# Patient Record
Sex: FEMALE | Race: BLACK OR AFRICAN AMERICAN | NOT HISPANIC OR LATINO
[De-identification: names, ages, dates, MRNs, and addresses within clinical notes are randomized per-mention and may not be internally consistent; named-entity substitution may affect disease eponyms.]

---

## 2020-07-22 ENCOUNTER — APPOINTMENT (OUTPATIENT)
Dept: OBGYN | Facility: CLINIC | Age: 25
End: 2020-07-22
Payer: MEDICAID

## 2020-07-22 VITALS
HEIGHT: 61 IN | WEIGHT: 216.25 LBS | SYSTOLIC BLOOD PRESSURE: 125 MMHG | BODY MASS INDEX: 40.83 KG/M2 | DIASTOLIC BLOOD PRESSURE: 80 MMHG

## 2020-07-22 DIAGNOSIS — Z01.419 ENCOUNTER FOR GYNECOLOGICAL EXAMINATION (GENERAL) (ROUTINE) W/OUT ABNORMAL FINDINGS: ICD-10-CM

## 2020-07-22 PROBLEM — Z00.00 ENCOUNTER FOR PREVENTIVE HEALTH EXAMINATION: Status: ACTIVE | Noted: 2020-07-22

## 2020-07-22 LAB
HCG UR QL: NEGATIVE
QUALITY CONTROL: YES

## 2020-07-22 PROCEDURE — 99385 PREV VISIT NEW AGE 18-39: CPT

## 2020-07-22 PROCEDURE — 81025 URINE PREGNANCY TEST: CPT

## 2020-07-22 NOTE — CHIEF COMPLAINT
[Initial Visit] : initial GYN visit [FreeTextEntry1] : the patient presents complaining of irregular menstrual periods, every 1 to 6 months.

## 2020-07-22 NOTE — HISTORY OF PRESENT ILLNESS
[Sexually Active] : is sexually active [Menarche Age: ____] : age at menarche was [unfilled] [Irregular Menses] : irregular menses [Regular Cycle Intervals] : periods have been irregular [Contraception] : does not use contraception

## 2020-07-22 NOTE — REVIEW OF SYSTEMS
[Nl] : Integumentary [Increased Time Between Periods] : increased time between periods [Irregular Cycle Intervals] : periods are irregular

## 2020-07-23 LAB
DHEA-S SERPL-MCNC: 189 UG/DL
FSH SERPL-MCNC: 3.2 IU/L
HCG-TM SERPL-MCNC: <1 MIU/ML
INSULIN P FAST SERPL-ACNC: 20.7 UU/ML
LH SERPL-ACNC: 6.6 IU/L
PROLACTIN SERPL-MCNC: 12 NG/ML
TSH SERPL-ACNC: 1.22 UIU/ML

## 2020-07-27 ENCOUNTER — APPOINTMENT (OUTPATIENT)
Dept: ULTRASOUND IMAGING | Facility: HOSPITAL | Age: 25
End: 2020-07-27

## 2020-07-27 LAB
CYTOLOGY CVX/VAG DOC THIN PREP: NORMAL
TESTOST BND SERPL-MCNC: 2.2 PG/ML
TESTOST SERPL-MCNC: 41.7 NG/DL

## 2020-07-28 LAB — 17OHP SERPL-MCNC: 24 NG/DL

## 2020-07-29 ENCOUNTER — APPOINTMENT (OUTPATIENT)
Dept: ULTRASOUND IMAGING | Facility: CLINIC | Age: 25
End: 2020-07-29
Payer: MEDICAID

## 2020-07-29 ENCOUNTER — OUTPATIENT (OUTPATIENT)
Dept: OUTPATIENT SERVICES | Facility: HOSPITAL | Age: 25
LOS: 1 days | End: 2020-07-29
Payer: MEDICAID

## 2020-07-29 DIAGNOSIS — N92.6 IRREGULAR MENSTRUATION, UNSPECIFIED: ICD-10-CM

## 2020-07-29 DIAGNOSIS — Z00.00 ENCOUNTER FOR GENERAL ADULT MEDICAL EXAMINATION WITHOUT ABNORMAL FINDINGS: ICD-10-CM

## 2020-07-29 PROCEDURE — 76856 US EXAM PELVIC COMPLETE: CPT

## 2020-07-29 PROCEDURE — 76856 US EXAM PELVIC COMPLETE: CPT | Mod: 26

## 2020-08-05 ENCOUNTER — APPOINTMENT (OUTPATIENT)
Dept: OBGYN | Facility: CLINIC | Age: 25
End: 2020-08-05
Payer: MEDICAID

## 2020-08-05 VITALS
SYSTOLIC BLOOD PRESSURE: 125 MMHG | HEIGHT: 61 IN | WEIGHT: 225 LBS | BODY MASS INDEX: 42.48 KG/M2 | DIASTOLIC BLOOD PRESSURE: 88 MMHG

## 2020-08-05 DIAGNOSIS — Z30.09 ENCOUNTER FOR OTHER GENERAL COUNSELING AND ADVICE ON CONTRACEPTION: ICD-10-CM

## 2020-08-05 PROCEDURE — 99213 OFFICE O/P EST LOW 20 MIN: CPT

## 2020-08-05 NOTE — PHYSICAL EXAM
[Awake] : awake [Acute Distress] : no acute distress [Alert] : alert [Oriented x3] : oriented to person, place, and time [Tender] : non tender [Soft] : soft [Normal] : uterus [Nabothian Cyst ___ cm] : had a [unfilled] ~Ucm nabothian cyst [No Bleeding] : there was no active vaginal bleeding [Uterine Adnexae] : were not tender and not enlarged

## 2020-08-05 NOTE — REVIEW OF SYSTEMS
[Increased Time Between Periods] : increased time between periods [Irregular Cycle Intervals] : periods are irregular [Nl] : Musculoskeletal

## 2020-09-05 ENCOUNTER — EMERGENCY (EMERGENCY)
Facility: HOSPITAL | Age: 25
LOS: 1 days | Discharge: HOME | End: 2020-09-05
Attending: EMERGENCY MEDICINE
Payer: MEDICAID

## 2020-09-05 VITALS
TEMPERATURE: 98 F | RESPIRATION RATE: 16 BRPM | OXYGEN SATURATION: 97 % | HEIGHT: 61 IN | DIASTOLIC BLOOD PRESSURE: 78 MMHG | SYSTOLIC BLOOD PRESSURE: 137 MMHG | WEIGHT: 220.02 LBS | HEART RATE: 105 BPM

## 2020-09-05 PROCEDURE — 99285 EMERGENCY DEPT VISIT HI MDM: CPT

## 2020-09-05 NOTE — ED ADULT TRIAGE NOTE - CHIEF COMPLAINT QUOTE
Patient A&Ox4 complaining of sudden onset chest pain that began at appx 6:30pm tonight. Stated feels like heart is racing & pain is sharp in nature.

## 2020-09-06 VITALS
OXYGEN SATURATION: 100 % | DIASTOLIC BLOOD PRESSURE: 59 MMHG | SYSTOLIC BLOOD PRESSURE: 119 MMHG | TEMPERATURE: 98 F | HEART RATE: 85 BPM | RESPIRATION RATE: 17 BRPM

## 2020-09-06 LAB
ALBUMIN SERPL ELPH-MCNC: 4 G/DL — SIGNIFICANT CHANGE UP (ref 3.3–5.2)
ALP SERPL-CCNC: 103 U/L — SIGNIFICANT CHANGE UP (ref 40–120)
ALT FLD-CCNC: 19 U/L — SIGNIFICANT CHANGE UP
ANION GAP SERPL CALC-SCNC: 13 MMOL/L — SIGNIFICANT CHANGE UP (ref 5–17)
ANISOCYTOSIS BLD QL: SLIGHT — SIGNIFICANT CHANGE UP
APTT BLD: 36.8 SEC — HIGH (ref 27.5–35.5)
AST SERPL-CCNC: 20 U/L — SIGNIFICANT CHANGE UP
BASOPHILS # BLD AUTO: 0 K/UL — SIGNIFICANT CHANGE UP (ref 0–0.2)
BASOPHILS NFR BLD AUTO: 0 % — SIGNIFICANT CHANGE UP (ref 0–2)
BILIRUB SERPL-MCNC: <0.2 MG/DL — LOW (ref 0.4–2)
BUN SERPL-MCNC: 6 MG/DL — LOW (ref 8–20)
CALCIUM SERPL-MCNC: 9.4 MG/DL — SIGNIFICANT CHANGE UP (ref 8.6–10.2)
CHLORIDE SERPL-SCNC: 96 MMOL/L — LOW (ref 98–107)
CO2 SERPL-SCNC: 28 MMOL/L — SIGNIFICANT CHANGE UP (ref 22–29)
CREAT SERPL-MCNC: 0.87 MG/DL — SIGNIFICANT CHANGE UP (ref 0.5–1.3)
D DIMER BLD IA.RAPID-MCNC: <150 NG/ML DDU — SIGNIFICANT CHANGE UP
ELLIPTOCYTES BLD QL SMEAR: SLIGHT — SIGNIFICANT CHANGE UP
EOSINOPHIL # BLD AUTO: 0.31 K/UL — SIGNIFICANT CHANGE UP (ref 0–0.5)
EOSINOPHIL NFR BLD AUTO: 3.5 % — SIGNIFICANT CHANGE UP (ref 0–6)
GIANT PLATELETS BLD QL SMEAR: PRESENT — SIGNIFICANT CHANGE UP
GLUCOSE SERPL-MCNC: 126 MG/DL — HIGH (ref 70–99)
HCG SERPL-ACNC: <4 MIU/ML — SIGNIFICANT CHANGE UP
HCT VFR BLD CALC: 40.9 % — SIGNIFICANT CHANGE UP (ref 34.5–45)
HGB BLD-MCNC: 13.1 G/DL — SIGNIFICANT CHANGE UP (ref 11.5–15.5)
INR BLD: 1.14 RATIO — SIGNIFICANT CHANGE UP (ref 0.88–1.16)
LIDOCAIN IGE QN: 21 U/L — LOW (ref 22–51)
LYMPHOCYTES # BLD AUTO: 2.74 K/UL — SIGNIFICANT CHANGE UP (ref 1–3.3)
LYMPHOCYTES # BLD AUTO: 30.7 % — SIGNIFICANT CHANGE UP (ref 13–44)
MAGNESIUM SERPL-MCNC: 1.9 MG/DL — SIGNIFICANT CHANGE UP (ref 1.6–2.6)
MANUAL SMEAR VERIFICATION: SIGNIFICANT CHANGE UP
MCHC RBC-ENTMCNC: 23.3 PG — LOW (ref 27–34)
MCHC RBC-ENTMCNC: 32 GM/DL — SIGNIFICANT CHANGE UP (ref 32–36)
MCV RBC AUTO: 72.6 FL — LOW (ref 80–100)
MICROCYTES BLD QL: SLIGHT — SIGNIFICANT CHANGE UP
MONOCYTES # BLD AUTO: 0.54 K/UL — SIGNIFICANT CHANGE UP (ref 0–0.9)
MONOCYTES NFR BLD AUTO: 6.1 % — SIGNIFICANT CHANGE UP (ref 2–14)
MYELOCYTES NFR BLD: 0.9 % — HIGH (ref 0–0)
NEUTROPHILS # BLD AUTO: 5.24 K/UL — SIGNIFICANT CHANGE UP (ref 1.8–7.4)
NEUTROPHILS NFR BLD AUTO: 58.8 % — SIGNIFICANT CHANGE UP (ref 43–77)
OVALOCYTES BLD QL SMEAR: SLIGHT — SIGNIFICANT CHANGE UP
PLAT MORPH BLD: NORMAL — SIGNIFICANT CHANGE UP
PLATELET # BLD AUTO: 541 K/UL — HIGH (ref 150–400)
POLYCHROMASIA BLD QL SMEAR: SLIGHT — SIGNIFICANT CHANGE UP
POTASSIUM SERPL-MCNC: 3.1 MMOL/L — LOW (ref 3.5–5.3)
POTASSIUM SERPL-SCNC: 3.1 MMOL/L — LOW (ref 3.5–5.3)
PROT SERPL-MCNC: 7.5 G/DL — SIGNIFICANT CHANGE UP (ref 6.6–8.7)
PROTHROM AB SERPL-ACNC: 13.1 SEC — SIGNIFICANT CHANGE UP (ref 10.6–13.6)
RBC # BLD: 5.63 M/UL — HIGH (ref 3.8–5.2)
RBC # FLD: 14.7 % — HIGH (ref 10.3–14.5)
RBC BLD AUTO: NORMAL — SIGNIFICANT CHANGE UP
SODIUM SERPL-SCNC: 137 MMOL/L — SIGNIFICANT CHANGE UP (ref 135–145)
TROPONIN T SERPL-MCNC: <0.01 NG/ML — SIGNIFICANT CHANGE UP (ref 0–0.06)
TROPONIN T SERPL-MCNC: <0.01 NG/ML — SIGNIFICANT CHANGE UP (ref 0–0.06)
WBC # BLD: 8.92 K/UL — SIGNIFICANT CHANGE UP (ref 3.8–10.5)
WBC # FLD AUTO: 8.92 K/UL — SIGNIFICANT CHANGE UP (ref 3.8–10.5)

## 2020-09-06 PROCEDURE — 83690 ASSAY OF LIPASE: CPT

## 2020-09-06 PROCEDURE — 36415 COLL VENOUS BLD VENIPUNCTURE: CPT

## 2020-09-06 PROCEDURE — 93010 ELECTROCARDIOGRAM REPORT: CPT

## 2020-09-06 PROCEDURE — 71045 X-RAY EXAM CHEST 1 VIEW: CPT

## 2020-09-06 PROCEDURE — 85730 THROMBOPLASTIN TIME PARTIAL: CPT

## 2020-09-06 PROCEDURE — 83735 ASSAY OF MAGNESIUM: CPT

## 2020-09-06 PROCEDURE — 84484 ASSAY OF TROPONIN QUANT: CPT

## 2020-09-06 PROCEDURE — 93005 ELECTROCARDIOGRAM TRACING: CPT

## 2020-09-06 PROCEDURE — 80053 COMPREHEN METABOLIC PANEL: CPT

## 2020-09-06 PROCEDURE — 85610 PROTHROMBIN TIME: CPT

## 2020-09-06 PROCEDURE — 85379 FIBRIN DEGRADATION QUANT: CPT

## 2020-09-06 PROCEDURE — 85027 COMPLETE CBC AUTOMATED: CPT

## 2020-09-06 PROCEDURE — 96374 THER/PROPH/DIAG INJ IV PUSH: CPT

## 2020-09-06 PROCEDURE — 84702 CHORIONIC GONADOTROPIN TEST: CPT

## 2020-09-06 PROCEDURE — 99284 EMERGENCY DEPT VISIT MOD MDM: CPT | Mod: 25

## 2020-09-06 PROCEDURE — 71045 X-RAY EXAM CHEST 1 VIEW: CPT | Mod: 26

## 2020-09-06 RX ORDER — SODIUM CHLORIDE 9 MG/ML
1000 INJECTION, SOLUTION INTRAVENOUS
Refills: 0 | Status: COMPLETED | OUTPATIENT
Start: 2020-09-06 | End: 2020-09-06

## 2020-09-06 RX ORDER — KETOROLAC TROMETHAMINE 30 MG/ML
15 SYRINGE (ML) INJECTION ONCE
Refills: 0 | Status: DISCONTINUED | OUTPATIENT
Start: 2020-09-06 | End: 2020-09-06

## 2020-09-06 RX ORDER — POTASSIUM CHLORIDE 20 MEQ
40 PACKET (EA) ORAL ONCE
Refills: 0 | Status: COMPLETED | OUTPATIENT
Start: 2020-09-06 | End: 2020-09-06

## 2020-09-06 RX ADMIN — Medication 40 MILLIEQUIVALENT(S): at 01:40

## 2020-09-06 RX ADMIN — Medication 15 MILLIGRAM(S): at 03:26

## 2020-09-06 RX ADMIN — Medication 15 MILLIGRAM(S): at 05:27

## 2020-09-06 RX ADMIN — SODIUM CHLORIDE 250 MILLILITER(S): 9 INJECTION, SOLUTION INTRAVENOUS at 02:07

## 2020-09-06 NOTE — ED ADULT NURSE REASSESSMENT NOTE - NS ED NURSE REASSESS COMMENT FT1
report received from off going RN, pt AOX3 with stable vitals, awaiting DC papers. even and unlabored resps present, skin warm dry and intact.

## 2020-09-06 NOTE — ED PROVIDER NOTE - OBJECTIVE STATEMENT
Pt is a 25 y.o. F hx of HTN and asthma presenting with chest pain for six hours. Chest pain is left sided, no radiation, +palpitations. Denies sweats, nausea or vomiting. Only medications are HCTZ, and losartan. Denies ever having these symptoms before. Denies fever, sweats, chills, shortness of breath.

## 2020-09-06 NOTE — ED ADULT NURSE REASSESSMENT NOTE - NS ED NURSE REASSESS COMMENT FT1
Patient in stable condition, A/Ox4. denies any pain,  no distress noted. NSR on cardiac monitor, pending dispo

## 2020-09-06 NOTE — ED PROVIDER NOTE - PATIENT PORTAL LINK FT
30.1 You can access the FollowMyHealth Patient Portal offered by [facility] by registering at the following website: [email or web address]. By joining [employer]’s FollowMyHealth portal, you will also be able to view your health information using other applications (apps) compatible with our system.

## 2020-09-06 NOTE — ED PROVIDER NOTE - ATTENDING CONTRIBUTION TO CARE
Pt with chest pain localized along the left sternal border, reproducible on my exam with palpation. She does intermittently appear dyspneic but denies any shortness of breath. Feels worse laying flat. ECG with non-specific t-wave changes but does not appear acutely ischemic and there are no findings of pericarditis. CXR with clear lungs. Pt is low risk for PE and d-dimer is negative. Troponin negative x 2 so unlikely to be MI or myocarditis. Had improvement with NSAID, suspect this is costochondritis.

## 2020-09-06 NOTE — ED ADULT NURSE NOTE - NSIMPLEMENTINTERV_GEN_ALL_ED
Implemented All Universal Safety Interventions:  [place] to call system. Call bell, personal items and telephone within reach. Instruct patient to call for assistance. Room bathroom lighting operational. Non-slip footwear when patient is off stretcher. Physically safe environment: no spills, clutter or unnecessary equipment. Stretcher in lowest position, wheels locked, appropriate side rails in place.

## 2020-09-06 NOTE — ED PROVIDER NOTE - PHYSICAL EXAMINATION
General: NAD  Head:  NC, AT  Eyes: EOMI, PERRLA, no scleral icterus  Ears: no erythema/drainage  Nose: midline, no bleeding/drainage  Throat: MMM  Cardiac: rapid rate, regular rhythm, no m/r/g, no lower extremity edema  Respiratory: CTABL, no wheezes/rales/rhonchi, equal chest wall expansions, visible short of breath when speaking  Abdomen: soft, ND, NT, no rebound tenderness, no guarding, nonperitonitic  MSK/Vascular: full ROM, distal pulses intact, soft compartments, warm extremities  Neuro: AAOx3, motor/sensory intact  Psych: anxious affect, cooperative

## 2020-09-06 NOTE — ED PROVIDER NOTE - CLINICAL SUMMARY MEDICAL DECISION MAKING FREE TEXT BOX
Pt is a 25 y.o. F presenting with chest pain, palpitations and visibly short of breath. Labs, meds, imaging, ekg.

## 2020-09-06 NOTE — ED ADULT NURSE REASSESSMENT NOTE - NS ED NURSE REASSESS COMMENT FT1
Patient in stable condition, sleeping with no distress noted. NSR on cardiac monitor, HR 99. Pending repeat trop. fluids infusing, no reaction noted, See MAR. Updated on POC.

## 2020-09-06 NOTE — ED PROVIDER NOTE - NSFOLLOWUPINSTRUCTIONS_ED_ALL_ED_FT
Nonspecific Chest Pain    Chest pain can be caused by many different conditions. There is a chance that your pain could be related to something serious, such as a heart attack or a blood clot in your lungs. Chest pain can also be caused by conditions that are not life-threatening. If you have chest pain, it is very important to follow up with your doctor.     Follow these instructions at home:  Medicines    If you were prescribed an antibiotic medicine, take it as told by your doctor. Do not stop taking the antibiotic even if you start to feel better.  Take over-the-counter and prescription medicines only as told by your doctor.    Lifestyle    Do not use any products that contain nicotine or tobacco, such as cigarettes and e-cigarettes. If you need help quitting, ask your doctor.  Do not drink alcohol.  Make lifestyle changes as told by your doctor. These may include:   Getting regular exercise. Ask your doctor for some activities that are safe for you.  Eating a heart-healthy diet. A diet specialist (dietitian) can help you to learn healthy eating options.  Staying at a healthy weight.  Managing diabetes, if needed.  Lowering your stress, as with deep breathing or spending time in nature.    General instructions    Avoid any activities that make you feel chest pain.  If your chest pain is because of heartburn:  Raise (elevate) the head of your bed about 6 inches (15 cm). You can do this by putting blocks under the bed legs at the head of the bed.  Do not sleep with extra pillows under your head. That does not help heartburn.  Keep all follow-up visits as told by your doctor. This is important. This includes any further testing if your chest pain does not go away.    Contact a doctor if:  Your chest pain does not go away.  You have a rash with blisters on your chest.  You have a fever.  You have chills.    Get help right away if:  Your chest pain is worse.  You have a cough that gets worse, or you cough up blood.  You have very bad (severe) pain in your belly (abdomen).  You are very weak.  You pass out (faint).  You have either of these for no clear reason:  Sudden chest discomfort.  Sudden discomfort in your arms, back, neck, or jaw.  You have shortness of breath at any time.  You suddenly start to sweat, or your skin gets clammy.  You feel sick to your stomach (nauseous).  You throw up (vomit).  You suddenly feel light-headed or dizzy.  Your heart starts to beat fast, or it feels like it is skipping beats.    These symptoms may be an emergency. Do not wait to see if the symptoms will go away. Get medical help right away. Call your local emergency services (911 in the [place] Do not drive yourself to the hospital.    ADDITIONAL NOTES AND INSTRUCTIONS    Please follow up with your Primary MD in 24-48 hr.  Seek immediate medical care for any new/worsening signs or symptoms.

## 2020-09-06 NOTE — ED PROVIDER NOTE - CARE PROVIDER_API CALL
Henry Do  CARDIOVASCULAR DISEASE  [address], [address]  [place] [address]  Phone: [phone]  Fax: [phone]  Follow Up Time:

## 2020-09-06 NOTE — ED ADULT NURSE REASSESSMENT NOTE - NS ED NURSE REASSESS COMMENT FT1
Patient in stable condition, A/Ox4. denies any pain,  no distress noted. NSR on cardiac monitor, . Repeat trop (-). MD aware. fluids infusing, no reaction noted, See MAR. Updated on POC

## 2020-09-08 PROBLEM — J45.909 UNSPECIFIED ASTHMA, UNCOMPLICATED: Chronic | Status: ACTIVE | Noted: 2020-09-06

## 2020-09-08 PROBLEM — I10 ESSENTIAL (PRIMARY) HYPERTENSION: Chronic | Status: ACTIVE | Noted: 2020-09-06

## 2020-09-09 ENCOUNTER — APPOINTMENT (OUTPATIENT)
Dept: CARDIOLOGY | Facility: CLINIC | Age: 25
End: 2020-09-09
Payer: MEDICAID

## 2020-09-09 VITALS
SYSTOLIC BLOOD PRESSURE: 132 MMHG | DIASTOLIC BLOOD PRESSURE: 83 MMHG | TEMPERATURE: 98.9 F | HEART RATE: 111 BPM | WEIGHT: 216 LBS | BODY MASS INDEX: 39.75 KG/M2 | OXYGEN SATURATION: 99 % | HEIGHT: 62 IN

## 2020-09-09 VITALS — SYSTOLIC BLOOD PRESSURE: 128 MMHG | DIASTOLIC BLOOD PRESSURE: 84 MMHG

## 2020-09-09 DIAGNOSIS — Z87.09 PERSONAL HISTORY OF OTHER DISEASES OF THE RESPIRATORY SYSTEM: ICD-10-CM

## 2020-09-09 DIAGNOSIS — Z86.79 PERSONAL HISTORY OF OTHER DISEASES OF THE CIRCULATORY SYSTEM: ICD-10-CM

## 2020-09-09 PROCEDURE — 99203 OFFICE O/P NEW LOW 30 MIN: CPT | Mod: 25

## 2020-09-09 PROCEDURE — 93000 ELECTROCARDIOGRAM COMPLETE: CPT

## 2020-09-09 RX ORDER — HYDROCHLOROTHIAZIDE 50 MG/1
50 TABLET ORAL DAILY
Refills: 0 | Status: ACTIVE | COMMUNITY
Start: 2020-09-09

## 2020-09-09 RX ORDER — NORETHINDRONE ACETATE AND ETHINYL ESTRADIOL AND FERROUS FUMARATE 1MG-20(21)
1-20 KIT ORAL DAILY
Qty: 3 | Refills: 1 | Status: DISCONTINUED | COMMUNITY
Start: 2020-08-05 | End: 2020-09-09

## 2020-09-09 RX ORDER — MEDROXYPROGESTERONE ACETATE 10 MG/1
10 TABLET ORAL
Qty: 10 | Refills: 0 | Status: DISCONTINUED | COMMUNITY
Start: 2020-08-05 | End: 2020-09-09

## 2020-09-10 PROBLEM — Z87.09 HISTORY OF ASTHMA: Status: RESOLVED | Noted: 2020-07-22 | Resolved: 2020-09-10

## 2020-09-10 PROBLEM — Z86.79 HISTORY OF HYPERTENSION: Status: RESOLVED | Noted: 2020-07-22 | Resolved: 2020-09-10

## 2020-09-10 NOTE — PHYSICAL EXAM
[General Appearance - Well Developed] : well developed [General Appearance - Well Nourished] : well nourished [Normal Appearance] : normal appearance [Well Groomed] : well groomed [No Deformities] : no deformities [General Appearance - In No Acute Distress] : no acute distress [Normal Conjunctiva] : the conjunctiva exhibited no abnormalities [Eyelids - No Xanthelasma] : the eyelids demonstrated no xanthelasmas [No Oral Pallor] : no oral pallor [Normal Oral Mucosa] : normal oral mucosa [No Oral Cyanosis] : no oral cyanosis [Normal Jugular Venous A Waves Present] : normal jugular venous A waves present [Normal Jugular Venous V Waves Present] : normal jugular venous V waves present [No Jugular Venous Moore A Waves] : no jugular venous moore A waves [Murmurs] : no murmurs present [Heart Rate And Rhythm] : heart rate and rhythm were normal [Heart Sounds] : normal S1 and S2 [Exaggerated Use Of Accessory Muscles For Inspiration] : no accessory muscle use [Respiration, Rhythm And Depth] : normal respiratory rhythm and effort [FreeTextEntry1] : HR repeated 95 bpm  [Auscultation Breath Sounds / Voice Sounds] : lungs were clear to auscultation bilaterally [Abdomen Soft] : soft [Abdomen Mass (___ Cm)] : no abdominal mass palpated [Abdomen Tenderness] : non-tender [Abnormal Walk] : normal gait [Gait - Sufficient For Exercise Testing] : the gait was sufficient for exercise testing [Cyanosis, Localized] : no localized cyanosis [Nail Clubbing] : no clubbing of the fingernails [Petechial Hemorrhages (___cm)] : no petechial hemorrhages [] : no rash [Skin Lesions] : no skin lesions [No Venous Stasis] : no venous stasis [Skin Color & Pigmentation] : normal skin color and pigmentation [No Xanthoma] : no  xanthoma was observed [No Skin Ulcers] : no skin ulcer [Oriented To Time, Place, And Person] : oriented to person, place, and time [Affect] : the affect was normal [Mood] : the mood was normal [No Anxiety] : not feeling anxious

## 2020-09-10 NOTE — HISTORY OF PRESENT ILLNESS
[FreeTextEntry1] : 26 y/o AAF with PMH of HTN (diagnosed since 2013 and recently changed to Losartan - HCTZ) presents with complaints of long standing hypertension and need for cardiac evaluation \par Patient states that prior to this visit was in the ER at [facility] with BP of  200/123 with associated chest pain for six hours. Chest pain is left sided, no radiation, +palpitations was then discharged with follow up. \par Notes no associated shortness of breath or lower extremity edema.\par + morning headaches 1 x 2 times a week, no snoring or never finds herself dozing off while in the passenger seat of the car or while watching her favorite television show ([place] score is 0) \par Patient is complaints with  Losartan 50mg  / HCTZ 50mg and blood pressure today is better controlled. \par \par \par Social Hx: Smoke: no , alcohol use : social drinking , Drug use: none \par Family Hx: Mother - hx. of HTN and DM2  and two brothers ages 17 and 21 both have hx of HTN and on antihypertensives \par Pregnancies - none to full term \par No OCP use \par \par PMD at [facility] and had recent blood work there \par

## 2020-09-10 NOTE — REVIEW OF SYSTEMS
[Headache] : headache [Shortness Of Breath] : shortness of breath [Chest Pain] : chest pain [Palpitations] : palpitations

## 2020-09-10 NOTE — ASSESSMENT
[FreeTextEntry1] : 26 y/o AAF with PMH of HTN (diagnosed since 2013 and recently changed to Losartan - HCTZ) presents with complaints of long standing hypertension and need for cardiac evaluation \par \par 1. HTN\par - controlled today in the office \par - on Losartan 50mg po q daily and HCTZ 50mg po q daily \par - EKG reviewed sinus tachycardia with LVH \par - never had work up for secondary causes of HTN \par \par Plan: \par - patient had recent blood work done at PMD, discussed with patient would need copies of this paperwork and based on this if additional blood work is necessary would order (would require: CBC/ CMP/ TSH/ Renin/ Aldosterone/ urinary cortisol or dexamethasone suppression testing (to r/o cushings)/ Lipid panel/ Hgb AIC \par - patient has a scheduled to see an Endocrinologist \par - 2d echocardiogram to assess LV function and valvulopathy \par - Doppler ultrasonography of the renal arteries to rule out renal artery stenosis or FMD; discussed if this doesn’t off much information [facility] \par - [place] score is 0 but based on BMI patient require lifestyle modification, dietary modification and possible sleep study

## 2020-09-11 ENCOUNTER — NON-APPOINTMENT (OUTPATIENT)
Age: 25
End: 2020-09-11

## 2020-09-15 ENCOUNTER — OUTPATIENT (OUTPATIENT)
Dept: OUTPATIENT SERVICES | Facility: HOSPITAL | Age: 25
LOS: 1 days | End: 2020-09-15

## 2020-09-15 ENCOUNTER — APPOINTMENT (OUTPATIENT)
Dept: ULTRASOUND IMAGING | Facility: CLINIC | Age: 25
End: 2020-09-15
Payer: MEDICAID

## 2020-09-15 DIAGNOSIS — I10 ESSENTIAL (PRIMARY) HYPERTENSION: ICD-10-CM

## 2020-09-15 PROCEDURE — 93975 VASCULAR STUDY: CPT | Mod: 26

## 2020-10-15 ENCOUNTER — APPOINTMENT (OUTPATIENT)
Dept: CARDIOLOGY | Facility: CLINIC | Age: 25
End: 2020-10-15
Payer: MEDICAID

## 2020-10-15 PROCEDURE — 93306 TTE W/DOPPLER COMPLETE: CPT

## 2020-10-21 ENCOUNTER — APPOINTMENT (OUTPATIENT)
Dept: CARDIOLOGY | Facility: CLINIC | Age: 25
End: 2020-10-21
Payer: MEDICAID

## 2020-10-21 VITALS — HEIGHT: 62 IN | BODY MASS INDEX: 41.22 KG/M2 | TEMPERATURE: 98.4 F | WEIGHT: 224 LBS

## 2020-10-21 VITALS
HEART RATE: 76 BPM | OXYGEN SATURATION: 99 % | SYSTOLIC BLOOD PRESSURE: 130 MMHG | DIASTOLIC BLOOD PRESSURE: 85 MMHG | RESPIRATION RATE: 12 BRPM

## 2020-10-21 DIAGNOSIS — Z82.5 FAMILY HISTORY OF ASTHMA AND OTHER CHRONIC LOWER RESPIRATORY DISEASES: ICD-10-CM

## 2020-10-21 DIAGNOSIS — Z82.49 FAMILY HISTORY OF ISCHEMIC HEART DISEASE AND OTHER DISEASES OF THE CIRCULATORY SYSTEM: ICD-10-CM

## 2020-10-21 DIAGNOSIS — Z80.42 FAMILY HISTORY OF MALIGNANT NEOPLASM OF PROSTATE: ICD-10-CM

## 2020-10-21 DIAGNOSIS — I10 ESSENTIAL (PRIMARY) HYPERTENSION: ICD-10-CM

## 2020-10-21 DIAGNOSIS — Z83.3 FAMILY HISTORY OF DIABETES MELLITUS: ICD-10-CM

## 2020-10-21 PROCEDURE — 99072 ADDL SUPL MATRL&STAF TM PHE: CPT

## 2020-10-21 PROCEDURE — 99213 OFFICE O/P EST LOW 20 MIN: CPT

## 2020-10-21 RX ORDER — LOSARTAN POTASSIUM 50 MG/1
50 TABLET, FILM COATED ORAL
Refills: 0 | Status: ACTIVE | COMMUNITY

## 2020-10-26 PROBLEM — Z82.5 FAMILY HISTORY OF ASTHMA: Status: ACTIVE | Noted: 2020-07-22

## 2020-10-26 PROBLEM — Z82.49 FAMILY HISTORY OF HYPERTENSION: Status: ACTIVE | Noted: 2020-07-22

## 2020-10-26 PROBLEM — Z80.42 FAMILY HISTORY OF MALIGNANT NEOPLASM OF PROSTATE: Status: ACTIVE | Noted: 2020-07-22

## 2020-10-26 PROBLEM — Z83.3 FAMILY HISTORY OF DIABETES MELLITUS: Status: ACTIVE | Noted: 2020-07-22

## 2020-10-26 NOTE — HISTORY OF PRESENT ILLNESS
[FreeTextEntry1] : 24 y/o AAF with PMH of HTN (diagnosed since 2013 and recently changed to Losartan - HCTZ) presents with complaints of long standing hypertension and need for cardiac evaluation \par Patient states that prior to this visit was in the ER at [facility] with BP of  200/123 with associated chest pain for six hours. Chest pain is left sided, no radiation, +palpitations was then discharged with follow up. \par Notes no associated shortness of breath or lower extremity edema.\par + morning headaches 1 x 2 times a week, no snoring or never finds herself dozing off while in the passenger seat of the car or while watching her favorite television show ([place] score is 0) \par Patient is complaints with  Losartan 50mg  / HCTZ 50mg and blood pressure today is better controlled. \par \par Follow up 10/21/2020: \par patient states shes doing well with no complaints of any chest pain or shortness of breath; blood pressure is better controlled on current regimen \par Patient has since seen the Endocrinologist and has since had work up pending for PCOS. \par No chest pain or shortness of breath.\par \par Social Hx: Smoke: no , alcohol use : social drinking , Drug use: none \par Family Hx: Mother - hx. of HTN and DM2  and two brothers ages 17 and 21 both have hx of HTN and on antihypertensives \par Pregnancies - none to full term \par No OCP use \par \par PMD at [address] and had recent blood work \par

## 2020-10-26 NOTE — ASSESSMENT
[FreeTextEntry1] : 26 y/o AAF with PMH of HTN (diagnosed since 2013 and recently changed to Losartan - HCTZ) presents with complaints of long standing hypertension and need for cardiac evaluation \par \par 1. HTN\par - controlled today in the office \par - on Losartan 50mg po q daily and HCTZ 50mg po q daily \par - EKG reviewed sinus tachycardia with LVH \par - never had work up for secondary causes of HTN \par - 2d echocardiogram to assess LV function and valvulopathy: LVEF 55-60% \par - US renal: normal \par \par Plan: \par -continue with Losartan and HCTZ, blood pressure better controlled \par - [place] score is 0 but based on BMI patient require lifestyle modification, dietary modification and possible sleep study \par - followed up with Endocrinology, currently completing work up \par - follow up in 6 months \par \par Omaira Sinclair D.O.\par

## 2020-10-26 NOTE — PHYSICAL EXAM
[General Appearance - Well Developed] : well developed [Normal Appearance] : normal appearance [Well Groomed] : well groomed [General Appearance - Well Nourished] : well nourished [No Deformities] : no deformities [General Appearance - In No Acute Distress] : no acute distress [Normal Conjunctiva] : the conjunctiva exhibited no abnormalities [Eyelids - No Xanthelasma] : the eyelids demonstrated no xanthelasmas [Normal Oral Mucosa] : normal oral mucosa [No Oral Pallor] : no oral pallor [No Oral Cyanosis] : no oral cyanosis [Normal Jugular Venous A Waves Present] : normal jugular venous A waves present [Normal Jugular Venous V Waves Present] : normal jugular venous V waves present [No Jugular Venous Moore A Waves] : no jugular venous moore A waves [Respiration, Rhythm And Depth] : normal respiratory rhythm and effort [Exaggerated Use Of Accessory Muscles For Inspiration] : no accessory muscle use [Auscultation Breath Sounds / Voice Sounds] : lungs were clear to auscultation bilaterally [Heart Rate And Rhythm] : heart rate and rhythm were normal [Heart Sounds] : normal S1 and S2 [Murmurs] : no murmurs present [Abdomen Soft] : soft [Abdomen Tenderness] : non-tender [Abdomen Mass (___ Cm)] : no abdominal mass palpated [Abnormal Walk] : normal gait [Gait - Sufficient For Exercise Testing] : the gait was sufficient for exercise testing [Nail Clubbing] : no clubbing of the fingernails [Cyanosis, Localized] : no localized cyanosis [Petechial Hemorrhages (___cm)] : no petechial hemorrhages [Skin Color & Pigmentation] : normal skin color and pigmentation [] : no rash [No Venous Stasis] : no venous stasis [Skin Lesions] : no skin lesions [No Skin Ulcers] : no skin ulcer [No Xanthoma] : no  xanthoma was observed [Oriented To Time, Place, And Person] : oriented to person, place, and time [Affect] : the affect was normal [Mood] : the mood was normal [No Anxiety] : not feeling anxious [FreeTextEntry1] : HR repeated 95 bpm

## 2020-11-12 ENCOUNTER — APPOINTMENT (OUTPATIENT)
Dept: OBGYN | Facility: CLINIC | Age: 25
End: 2020-11-12

## 2020-11-24 ENCOUNTER — APPOINTMENT (OUTPATIENT)
Dept: OBGYN | Facility: CLINIC | Age: 25
End: 2020-11-24
Payer: MEDICAID

## 2020-11-24 VITALS
DIASTOLIC BLOOD PRESSURE: 80 MMHG | HEIGHT: 62 IN | WEIGHT: 220 LBS | SYSTOLIC BLOOD PRESSURE: 130 MMHG | BODY MASS INDEX: 40.48 KG/M2

## 2020-11-24 DIAGNOSIS — N92.6 IRREGULAR MENSTRUATION, UNSPECIFIED: ICD-10-CM

## 2020-11-24 PROCEDURE — 99072 ADDL SUPL MATRL&STAF TM PHE: CPT

## 2020-11-24 PROCEDURE — 99213 OFFICE O/P EST LOW 20 MIN: CPT

## 2020-11-24 NOTE — REASON FOR VISIT
[Follow-Up] : a follow-up evaluation of [FreeTextEntry2] : irregular menstrual periods. The patient was seen by an endocrinologist who feels that the patient likely has  PCOS. She instructed the patient to discontinue the oral contraceptives. She ordered a pelvic sonogram.

## 2020-12-11 ENCOUNTER — APPOINTMENT (OUTPATIENT)
Dept: ANTEPARTUM | Facility: CLINIC | Age: 25
End: 2020-12-11

## 2020-12-23 PROBLEM — Z01.419 ENCOUNTER FOR GYNECOLOGICAL EXAMINATION: Status: RESOLVED | Noted: 2020-07-22 | Resolved: 2020-12-23

## 2021-04-12 ENCOUNTER — APPOINTMENT (OUTPATIENT)
Dept: CARDIOLOGY | Facility: CLINIC | Age: 26
End: 2021-04-12

## 2021-05-26 NOTE — ED ADULT TRIAGE NOTE - IDEAL BODY WEIGHT(KG)
Writer spoke with Pt reminding her of appt 6/3/21 with labs to be completed prior.Pt verbalized understanding.   48

## 2021-08-25 ENCOUNTER — EMERGENCY (EMERGENCY)
Facility: HOSPITAL | Age: 26
LOS: 1 days | Discharge: HOME | End: 2021-08-25
Attending: EMERGENCY MEDICINE
Payer: MEDICAID

## 2021-08-25 VITALS
RESPIRATION RATE: 20 BRPM | DIASTOLIC BLOOD PRESSURE: 107 MMHG | HEIGHT: 61 IN | HEART RATE: 95 BPM | OXYGEN SATURATION: 98 % | SYSTOLIC BLOOD PRESSURE: 168 MMHG | TEMPERATURE: 100 F | WEIGHT: 235.01 LBS

## 2021-08-25 PROCEDURE — 93971 EXTREMITY STUDY: CPT

## 2021-08-25 PROCEDURE — 93971 EXTREMITY STUDY: CPT | Mod: 26,RT

## 2021-08-25 PROCEDURE — 96372 THER/PROPH/DIAG INJ SC/IM: CPT

## 2021-08-25 PROCEDURE — 99284 EMERGENCY DEPT VISIT MOD MDM: CPT

## 2021-08-25 PROCEDURE — 99284 EMERGENCY DEPT VISIT MOD MDM: CPT | Mod: 25

## 2021-08-25 RX ORDER — METHOCARBAMOL 500 MG/1
1500 TABLET, FILM COATED ORAL ONCE
Refills: 0 | Status: COMPLETED | OUTPATIENT
Start: 2021-08-25 | End: 2021-08-25

## 2021-08-25 RX ORDER — KETOROLAC TROMETHAMINE 30 MG/ML
30 SYRINGE (ML) INJECTION ONCE
Refills: 0 | Status: DISCONTINUED | OUTPATIENT
Start: 2021-08-25 | End: 2021-08-25

## 2021-08-25 RX ORDER — METHOCARBAMOL 500 MG/1
1 TABLET, FILM COATED ORAL
Qty: 14 | Refills: 0
Start: 2021-08-25 | End: 2021-08-31

## 2021-08-25 RX ORDER — LOSARTAN POTASSIUM 100 MG/1
1 TABLET, FILM COATED ORAL
Qty: 0 | Refills: 0 | DISCHARGE

## 2021-08-25 RX ORDER — LIDOCAINE 4 G/100G
1 CREAM TOPICAL ONCE
Refills: 0 | Status: COMPLETED | OUTPATIENT
Start: 2021-08-25 | End: 2021-08-25

## 2021-08-25 RX ORDER — IBUPROFEN 200 MG
1 TABLET ORAL
Qty: 28 | Refills: 0
Start: 2021-08-25 | End: 2021-08-31

## 2021-08-25 RX ADMIN — LIDOCAINE 1 PATCH: 4 CREAM TOPICAL at 14:13

## 2021-08-25 RX ADMIN — Medication 30 MILLIGRAM(S): at 14:12

## 2021-08-25 RX ADMIN — METHOCARBAMOL 1500 MILLIGRAM(S): 500 TABLET, FILM COATED ORAL at 14:12

## 2021-08-25 NOTE — ED ADULT NURSE NOTE - NSIMPLEMENTINTERV_GEN_ALL_ED
Implemented All Fall Risk Interventions:  [place] to call system. Call bell, personal items and telephone within reach. Instruct patient to call for assistance. Room bathroom lighting operational. Non-slip footwear when patient is off stretcher. Physically safe environment: no spills, clutter or unnecessary equipment. Stretcher in lowest position, wheels locked, appropriate side rails in place. Provide visual cue, wrist band, yellow gown, etc. Monitor gait and stability. Monitor for mental status changes and reorient to person, place, and time. Review medications for side effects contributing to fall risk. Reinforce activity limits and safety measures with patient and family.

## 2021-08-25 NOTE — ED ADULT NURSE NOTE - OBJECTIVE STATEMENT
pt. c/o right leg pain and tingling that began yesterday and low back pain that began today.  Pt. states she received 2nd dose of pfizer vaccine and symptoms began after.  Pt. denies chest pain or sob.  Slight swelling/redness noted to RLE.  Pt. states she needs help to ambulate r/t pain

## 2021-08-25 NOTE — ED PROVIDER NOTE - NSFOLLOWUPINSTRUCTIONS_ED_ALL_ED_FT
Back Pain    Back pain is very common in adults. The cause of back pain is rarely dangerous and the pain often gets better over time. The cause of your back pain may not be known and may include strain of muscles or ligaments, degeneration of the spinal disks, or arthritis. Occasionally the pain may radiate down your leg(s). Over-the-counter medicines to reduce pain and inflammation are often the most helpful. Stretching and remaining active frequently helps the healing process.     SEEK IMMEDIATE MEDICAL CARE IF YOU HAVE ANY OF THE FOLLOWING SYMPTOMS: bowel or bladder control problems, unusual weakness or numbness in your arms or legs, nausea or vomiting, abdominal pain, fever, dizziness/lightheadedness.     Hypertension    Hypertension, commonly called high blood pressure, is when the force of blood pumping through your arteries is too strong. Hypertension forces your heart to work harder to pump blood. Your arteries may become narrow or stiff. Having untreated or uncontrolled hypertension for a long period of time can cause heart attack, stroke, kidney disease, and other problems. If started on a medication, take exactly as prescribed by your health care professional. Maintain a healthy lifestyle and follow up with your primary care physician.    SEEK IMMEDIATE MEDICAL CARE IF YOU HAVE ANY OF THE FOLLOWING SYMPTOMS: severe headache, confusion, chest pain, abdominal pain, vomiting, or shortness of breath.     Please follow up with your doctor within 24-48 hours  Please have your doctor repeat your blood pressure

## 2021-08-25 NOTE — ED PROVIDER NOTE - PATIENT PORTAL LINK FT
You can access the FollowMyHealth Patient Portal offered by [facility] by registering at the following website: [email or web address]. By joining [employer]’s FollowMyHealth portal, you will also be able to view your health information using other applications (apps) compatible with our system.

## 2021-08-25 NOTE — ED ADULT NURSE NOTE - NSFALLRSKASSESSDT_ED_ALL_ED
OUTPATIENT PROGRESS NOTE    HISTORY:  The patient is a 85 year old female who comes in for a 6 month follow up visit.   The last time that I saw the patient was December 2019 for asthma exacerbation.     History of frequent UTIs. Last UTI in 2/2019. Please note that the patient has a pessary. It is not recommended for chronic prophylactic antibiotic treatment at this point. She currently has no urinary symptoms.      History of sleep apnea. Sleep study done 12/3/18 showed moderate obstructive sleep apnea. She was started on AutoPap. Patient is followed by pulmonology and was recently seen 6/1/2020 and no changes were made. She states that she is sleeping well.      History of breast cancer. Routine mammogram done in 11/2017 required additional testing. Biopsy with Dr Amezcua on 11/30/17 was positive for carcinoma. She was seen by Dr White and was recommended to have a left lumpectomy and left axillary sentinel lymph node biopsy. This was performed on 2/1/18 with no complications. Pathology showed invasive ductal carcinoma grade 2, ER/AZ positive HER-2/travis 1+ with clear surgical margins and negative sentinel lymph nodes. Patient was started on aromasin 25 mg daily for 5 years and due to possible side effects of worsening osteoporosis she was also started on denosumab infusions. Tolerating well with no reoccurrence. She has regular follow up appointments with Dr. Crabtree and Dr. White. Normal mammogram 11/2018. She is back to annual screenings.       History of paroxysmal atrial fibrillation/flutter. The patient is currently on eliquis 5 mg twice daily, cardizem 240 mg daily, and nadolol 10 mg daily as needed. Holter monitor in 2017 showed PVC's and NSVT and she was referred to EP. She is followed by Dr Iglesias. Dr Iglesias did recommend Nadolol 20 mg daily however she could not tolerate the full dose due to episodes of hypotension. She is taking only as needed for palpitations. Patient has CHADSVASC 4. The patient also  had loop recorder placed on 9/5/18.  Patient did have follow up visit with Dr. Iglesias on 9/26/18 and agreed to start Eliquis 5 mg twice daily. She has been tolerating this well. No side effects. Patient was last seen on 3/27/19 with no changes. Patient reports that she does have episodes of heart racing and palpations with elevated blood pressure.  She states that this happens about once every 3 months. Patient takes her nadolol and rests and symptoms resolve.  She does have a tendency for PVCs and bradycardia.  Few episodes of paroxysmal atrial fibrillation.  She does feel her PVCs.     History of osteoporosis. DEXA scan 2015 showed osteoporosis with low T score of -3.5. At that time she declined treatment with fosamax. Repeat DEXA scan from 2/27/18 showed lowest T-score of -2.8. As above, patient was started on Denosumab therapy. Patient was instructed to continue with calcium vitamin D supplementations and weightbearing exercises. Avoid falls.      History of mild aortic regurgitation.  Normal ejection fraction on echo from 2012  Repeat echo in 05/2015, 2/17/17, and 8/29/2019 with stable findings. Patient is followed by cardiology and she remains asymptomatic. She has no symptoms of heart failure or dyspnea on exertion.     History of hypertension. Patient is maintained on Cardizem 240 mg daily. Stable tolerating very well. No chest pain, shortness of breath, or dizziness noted. Blood pressure is adequate in the office today. Recommend low salt diet. Stay active.      History of GERD. We stopped Aciphex July 2017. Fortunately she has had no recurrent symptoms. If she has some symptoms she will take tums or zantac just as needed. She has no dysphagia or of odynophagia.     History of asthma. She is currently on Pulmicort 2 puffs daily and xopenex as needed. Last excerebration as above. Patient states that she has been having to use the xopenox more frequently. Recommend flu vaccination when available. Up to date  25-Aug-2021 13:45 on pneumonia vaccinations.     History of hyperlipidemia. The patient was previously on simvastatin however this was discontinued in 2016. She has no family history of first-degree family member with coronary artery disease. She is not diabetic. No evidence of peripheral vascular disease. She would like to try diet and exercise.  According to guidelines her goal LDL should be less than 160 no need to suppress any lower. We again reviewed this issue as her LDL is 144 from 9/2019. Recommendations do not recommend treatment at this age.      History of mildly dilated ascending aorta.  4.0 cm on echocardiogram.  Patient states that her sister had to have open heart surgery to fix a thoracic aneurysm. In October 2019 the patient went to a vascular screening event and the results of her abdominal ultrasound were listed as abnormal. They do not do a formal medical report but do recommend an annual abdominal ultrasound for AAA screening/monitoring. We will schedule the patient for CT chest abdomen without contrast for follow up.  The patient did develop reaction to the contrast in the past.  She does not want to take any contrast.  I told her that test without contrast is suboptimal for evaluation of the aneurysms however that might give us an idea.  She felt comfortable with that.    History of vitamin D deficiency. She is currently taking vitamin D 2000 units daily. Normal vitamin D level at 52.9 from 9/2019.     History of pessary and ovarian cyst followed by GYN.  Patient follow up ultrasound from 12/29/17 shows that the cyst is smaller both in dimension and volume. The changes are consistent with an resolving simple cyst.  She has no complaints at this point.    MEDICATIONS:  Medications were reviewed and updated today.  Current Outpatient Medications   Medication Sig Dispense Refill   • exemestane (AROMASIN) 25 MG tablet TAKE ONE TABLET BY MOUTH DAILY 90 tablet 1   • nystatin (MYCOSTATIN) 503933 UNIT/GM powder Apply  topically 3 times daily. 30 g 1   • nadolol (CORGARD) 20 MG tablet TAKE ONE-HALF TABLET BY MOUTH DAILY 45 tablet 1   • fluticasone (FLONASE) 50 MCG/ACT nasal spray SPRAY TWO SPRAYS IN EACH NOSTRIL ONCE DAILY FOR 7 DAYS THEN AS NEEDED 16 g 2   • levalbuterol (XOPENEX HFA) 45 MCG/ACT inhaler INHALE ONE TO TWO PUFFS BY MOUTH EVERY 4 HOURS AS NEEDED FOR WHEEZING 15 g 5   • ELIQUIS 5 MG Tab TAKE ONE TABLET BY MOUTH EVERY 12 HOURS 60 tablet 10   • budesonide (PULMICORT FLEXHALER) 180 MCG/ACT inhaler Inhale 2 puffs into the lungs daily. 1 each 11   • dilTIAZem (CARTIA XT) 240 MG 24 hr capsule Take 1 capsule by mouth daily. 90 capsule 3   • EPINEPHrine 0.3 MG/0.3ML auto-injector Inject 0.3 mLs into the muscle 1 time as needed for Anaphylaxis. 2 each 1   • DENOSumab (PROLIA) 60 MG/ML Solution Prefilled Syringe Inject 1 mL into the skin 1 time for 1 dose.     • azelastine (ASTEPRO) 0.15 % nasal spray SPRAY ONE SPRAY IN EACH NOSTRIL ONCE DAILY AS NEEDED 30 mL 3   • Magnesium Cl-Calcium Carbonate (SLOW-MAG PO) Take by mouth daily.     • aluminum-magnesium hydroxide-simethicone (MAALOX) 200-200-20 MG/5ML SUSP Take 30 mLs by mouth every 4 hours as needed (indigestion).      • ascorbic acid (VITAMIN C) 500 MG tablet Take 500 mg by mouth daily.     • Cholecalciferol (VITAMIN D) 1000 UNITS capsule Take 2,000 Units by mouth daily.      • cyanocobalamin (VITAMIN B-12) 250 MCG tablet Take 250 mcg by mouth daily.      • guaiFENesin (MUCINEX) 600 MG 12 hr tablet Take 600 mg by mouth daily as needed for Congestion.      • Multiple Vitamins-Minerals (ONE DAILY 50 PLUS PO) Take 1 tablet by mouth daily.      • Omega-3 Fatty Acids (OMEGA 3 PO) Take 2 capsules by mouth daily.        No current facility-administered medications for this visit.      ALLERGIES:  Allergies as of 06/03/2020 - Reviewed 06/03/2020   Allergen Reaction Noted   • Eggs or egg-derived products   (food or med) SHORTNESS OF BREATH 11/05/2013   • Lactose   (food or med)  SHORTNESS OF BREATH 11/05/2013   • Mold   (environmental) SHORTNESS OF BREATH 11/05/2013   • Mushrooms [mushroom   (food)] SHORTNESS OF BREATH 12/12/2017   • Penicillins SHORTNESS OF BREATH 11/05/2013   • Shellfish allergy   (food or med) SHORTNESS OF BREATH 11/05/2013   • Sulfa antibiotics SHORTNESS OF BREATH 11/05/2013     REVIEW OF SYSTEMS: SEE HPI  General:  No fatigue.  No fever.  No chills.  Overall feeling very well.  Cardiac:  No chest pain.  No palpitation.  No syncope or near syncope.  No orthopnea or paroxysmal nocturnal dyspnea.  Respiratory:  No cough.  No wheezing.  No hemoptysis.  No shortness of breath.  Gastrointestinal:  No nausea.  No vomiting.  No abdominal pain.  No diarrhea.  No rectal bleeding.  No dysphagia.  Genitourinary:  No hematuria.  No flank pain.  No dysuria.  No irritative urinary symptoms.  Normal stream.  Neurologic:  No headache.  No unilateral sensory or motor dysfunction.  No dysarthria. No double vision.  Endocrine:  No weight change.  No dry skin.  No palpitation.  No hair loss.  No constipation.  Skin:  No rash, ulcers or pruritus.  ENT:  No runny nose.  No sneezing.  No nasal drainage.  Eyes:  No drainage.  No pain.  Mental:  No anxiety or depression.    PHYSICAL EXAM:  Vitals:  Blood pressure 138/74, pulse 68, resp. rate 16, height 5' 3\" (1.6 m), weight 72.6 kg..  Wt Readings from Last 3 Encounters:   06/03/20 72.6 kg   06/01/20 73.8 kg   05/26/20 72.5 kg     ENT:  Anicteric sclerae.  No pallor.  Oral mucosa moist.  No ulcers, thrush or erythema.  General:  Patient is alert, awake, oriented x3, not in acute distress, pleasant.  Neck:  Neck supple.  No lymphadenopathy.  No jugular venous distension or bruits.  Cardiac:  Regular rate and rhythm.  Chronic diastolic murmur. No rubs or gallops.  No extra sounds.  Point of maximal impulse is normal.  Chest:  Clear.  No wheezing.  No rales.  Good air entry.  No rhonchi.  Abdomen:  Soft.  Nontender.  Nondistended.  No  hepatosplenomegaly.  Legs:  Normal.  No edema.  No deep vein thrombosis.  Skin:  Warm and dry with no rashes.    LABORATORY:  I have reviewed the pertinent laboratory tests.  These are the pertinent findings:  Lab Results   Component Value Date    HGBA1C 5.5 09/18/2019    CHOLESTEROL 210 (H) 09/18/2019    HDL 51 09/18/2019    CALCLDL 144 (H) 09/18/2019    TRIGLYCERIDE 76 09/18/2019    POTASSIUM 4.0 05/26/2020    CREATININE 0.85 05/26/2020    AST 17 05/26/2020    BILIRUBIN 0.6 05/26/2020    TSH 2.276 04/07/2017    WBC 6.3 05/26/2020    HGB 14.3 05/26/2020     05/26/2020    BNP 36 01/28/2017    VITD25 52.9 09/18/2019     ASSESSMENT:  1. GRAHAM on CPAP - stable, patient is compliant. Followed by pulmonology   2. Moderate persistent asthma with exacerbation - continue pulmicort twice daily and xopenox as needed.  She did have an exacerbation in December 16, 2019 which was treated appropriately and symptoms have resolved.   3. Benign essential HTN - stable on diltiazem 240 mg daily and nadolol 10 mg daily as needed. Low salt diet. Stay active.    4. Moderate aortic regurgitation - stable on previous echo from 2019. Patient is asymptomatic.    5. Near syncope - stable, patient has had no recurrence.    6. PAF (paroxysmal atrial fibrillation) (CMS/HCC) - stable on eliquis 5 mg twice daily, diltiazem 240 mg daily, and nadolol 10 mg daily as needed. Followed by EP and cardiology. Patient is asymptomatic.  She will keep her appointment with electrophysiology in August of 2020.    7. Vitamin D deficiency - stable on vitamin D 2000 units daily.    8. GERD without esophagitis - stable off of medications. No red flags. No dysphagia.   9. Age-related osteoporosis without current pathological fracture - stable on prolia injections. Followed by Dr Crabtree. Recommend weight bearing exercises. Avoid falls.    10. Malignant neoplasm of upper-outer quadrant of left breast in female, estrogen receptor positive (CMS/HCC) - stable on  aromasin 25 mg daily. Followed by Dr Crabtree. Up to date on mammogram.    11. Status post placement of implantable loop recorder - stable, followed closely by Dr Iglesias in . Up to date on remote checks.    12. Thoracic aortic aneurysm without rupture (CMS/HCC) - 4.0 cm.  Concerns about abdominal aneurysm.  See discussion above.  schedule CT chest abdomen without contrast for evaluation.  We will follow up on the results.  The patient is completely asymptomatic.     Patient Instructions   Schedule CT scan of the chest abdomen without contrast.  Evaluate ascending and abdominal aorta.  Patient does not want to take contrast because of history of allergies  Continue the same medication  For precaution  Stay active  Keep appointment with other physicians  Call if any questions or concerns  See me in 4 months for wellness examination earlier if needed.    All the above was discussed with the patient in detail; questions were answered to the patient's satisfaction.  Patient left the office in stable condition with verbal and written instructions.    ILilly CMA, attest that I performed the duties of a scribe for this encounter, in the presence of Dr. Juan Diego Araiza.    I, Dr. Araiza, was present with Lilly Adams MA, who acted as a scribe for me during the entire visit and agree with all the above. History of present illness, Review of systems , Medication review, Physical examination and assessment / plan were all performed by myself.  Portions of this note are brought forward from previous notes, Edited and reviewed by myself as appropriate.

## 2021-08-25 NOTE — ED PROVIDER NOTE - OBJECTIVE STATEMENT
27 y/o female presents c/o right sided lower back pain radiating down her right leg. PT also reports swelling to the RLE. Denies any cp, sob, palpitations, or syncope

## 2021-08-25 NOTE — ED PROVIDER NOTE - PHYSICAL EXAMINATION
Back: no midline tenderness, + right sided lateral lumbar tenderness, strength 5/5 lower ext,   No lower ext edema or calf TTP noted b/l

## 2021-08-25 NOTE — ED PROVIDER NOTE - CHPI ED SYMPTOMS NEG
no bladder dysfunction/no bowel dysfunction/no numbness/no tingling/no difficulty bearing weight/no motor function loss

## 2021-10-06 PROBLEM — I10 ESSENTIAL HYPERTENSION: Status: ACTIVE | Noted: 2020-09-09

## 2021-12-17 ENCOUNTER — APPOINTMENT (OUTPATIENT)
Dept: CT IMAGING | Facility: CLINIC | Age: 26
End: 2021-12-17

## 2023-02-20 NOTE — ED PROVIDER NOTE - NO PERTINENT FAMILY HISTORY
<<----- Click to add NO pertinent Family History Patient requests all Lab, Cardiology, and Radiology Results on their Discharge Instructions

## 2025-02-11 ENCOUNTER — EMERGENCY (EMERGENCY)
Facility: HOSPITAL | Age: 30
LOS: 1 days | Discharge: HOME | End: 2025-02-11
Attending: EMERGENCY MEDICINE
Payer: COMMERCIAL

## 2025-02-11 VITALS
TEMPERATURE: 98 F | OXYGEN SATURATION: 96 % | HEART RATE: 75 BPM | SYSTOLIC BLOOD PRESSURE: 133 MMHG | DIASTOLIC BLOOD PRESSURE: 93 MMHG | RESPIRATION RATE: 18 BRPM

## 2025-02-11 PROCEDURE — 73564 X-RAY EXAM KNEE 4 OR MORE: CPT | Mod: 26,LT

## 2025-02-11 PROCEDURE — 99284 EMERGENCY DEPT VISIT MOD MDM: CPT

## 2025-02-11 PROCEDURE — 73564 X-RAY EXAM KNEE 4 OR MORE: CPT

## 2025-02-11 PROCEDURE — 71046 X-RAY EXAM CHEST 2 VIEWS: CPT

## 2025-02-11 PROCEDURE — 71046 X-RAY EXAM CHEST 2 VIEWS: CPT | Mod: 26

## 2025-02-11 RX ORDER — IBUPROFEN 600 MG/1
600 TABLET, FILM COATED ORAL ONCE
Refills: 0 | Status: COMPLETED | OUTPATIENT
Start: 2025-02-11 | End: 2025-02-11

## 2025-02-11 RX ORDER — ACETAMINOPHEN 160 MG/5ML
650 SUSPENSION ORAL ONCE
Refills: 0 | Status: COMPLETED | OUTPATIENT
Start: 2025-02-11 | End: 2025-02-11

## 2025-02-11 RX ADMIN — ACETAMINOPHEN 650 MILLIGRAM(S): 160 SUSPENSION ORAL at 18:25

## 2025-02-11 RX ADMIN — IBUPROFEN 600 MILLIGRAM(S): 600 TABLET, FILM COATED ORAL at 18:26

## 2025-02-11 NOTE — ED PROVIDER NOTE - ATTENDING APP SHARED VISIT CONTRIBUTION OF CARE
I examined the patient and agree with resident findings and plan. See full note for detali in short.  29yo female with s/p  slip and fall, landed on L knee and L side of chest. Is ambulattory no crepitus . No evidence of pneumothorax or infiltrate or fracture on xr pain controlled

## 2025-02-11 NOTE — ED ADULT TRIAGE NOTE - CHIEF COMPLAINT QUOTE
BIBEMS with BL rib pain after slipping on ice last night and landed on abdomen. RR even and unlabored.

## 2025-02-11 NOTE — ED ADULT TRIAGE NOTE - NS ED NURSE BANDS TYPE
Lab Facility: 0 Expected Date Of Service: 01/13/2022 Billing Type: Third-Party Bill Performing Laboratory: [id number] Bill For Surgical Tray: no Name band;

## 2025-02-11 NOTE — ED PROVIDER NOTE - NSFOLLOWUPINSTRUCTIONS_ED_ALL_ED_FT
Please follow up with orthopedic specialist within 3 days.    Please follow up with PCP within 3 days.    Contusion    A contusion is a deep bruise. Contusions are the result of a blunt injury to tissues and muscle fibers under the skin. The skin overlying the contusion may turn blue, purple, or yellow. Symptoms also include pain and swelling in the injured area.    SEEK IMMEDIATE MEDICAL CARE IF YOU HAVE ANY OF THE FOLLOWING SYMPTOMS: severe pain, numbness, tingling, pain, weakness, or skin color/temperature change in any part of your body distal to the injury.

## 2025-02-11 NOTE — ED PROVIDER NOTE - CLINICAL SUMMARY MEDICAL DECISION MAKING FREE TEXT BOX
29yo female with pmhx of HTN, asthma presents to ED c/o fall. Pt states that last night she slipped and fell on black ice, landed on L knee and L side of chest. 31yo female with pmhx of HTN, asthma presents to ED c/o fall. Pt states that last night she slipped and fell on black ice, landed on L knee and L side of chest. XRs reviewed. No acute fx seen on XR chest and XR L knee. Pt pain controlled with meds in ED. Pt to be discharged with ortho follow up outpatient. Pt agrees and understands with plan for discharge. Return precautions discussed with patient. 29yo female with pmhx of HTN, asthma presents to ED c/o fall. Pt states that last night she slipped and fell on black ice, landed on L knee and L side of chest. XRs reviewed. No acute fx seen on XR chest and XR L knee. Pt pain controlled with meds in ED. Pt states she feels better. Pt to be discharged with ortho follow up outpatient. Pt agrees and understands with plan for discharge. Return precautions discussed with patient. 29yo female with pmhx of HTN, asthma presents to ED c/o fall. Pt states that last night she slipped and fell on black ice, landed on L knee and L side of chest. XRs reviewed. No acute fx seen on XR chest and XR L knee. Pt pain controlled with meds in ED. Pt ambulatory in ED without difficulty. Pt to be discharged with ortho follow up outpatient. Pt agrees and understands with plan for discharge. Return precautions discussed with patient.

## 2025-02-11 NOTE — ED PROVIDER NOTE - PATIENT PORTAL LINK FT
You can access the FollowMyHealth Patient Portal offered by [facility] by registering at the following website: [email or web address]. By joining [facility]’s FollowMyHealth portal, you will also be able to view your health information using other applications (apps) compatible with our system.

## 2025-02-11 NOTE — ED PROVIDER NOTE - OBJECTIVE STATEMENT
29yo female with pmhx of HTN, asthma presents to ED c/o fall. Pt states that last night she slipped and fell on black ice, landed on L knee and L side of chest. Pt denies head strike, denies LOC, denies blood thinner use. Pt currently c/o L knee and L rib pain. Pt states her rib pain is worse with inspiration. Pt breathing unlabored, no acute distress, speaking in full sentences. Pt states she did not take any medications at home for pain. Pt denies any other complaints at this time.

## 2025-02-11 NOTE — ED ADULT NURSE NOTE - OBJECTIVE STATEMENT
Pt presents to the ED A&Ox4 c/o fall on black ice last night, the pain was controlled but today became unbearable. Pt denies hitting her head and LOC. Pt c/o pain to left side. No obvious signs of trauma. Pt RR even and unlabored, NAD noted.

## 2025-02-11 NOTE — ED ADULT NURSE NOTE - NSFALLUNIVINTERV_ED_ALL_ED
Bed/Stretcher in lowest position, wheels locked, appropriate side rails in place/Call bell, personal items and telephone in reach/Instruct patient to call for assistance before getting out of bed/chair/stretcher/Non-slip footwear applied when patient is off stretcher/[place] to call system/Physically safe environment - no spills, clutter or unnecessary equipment/Purposeful proactive rounding/Room/bathroom lighting operational, light cord in reach

## 2025-02-11 NOTE — ED PROVIDER NOTE - CARE PROVIDER_API CALL
Silvio Ramírez  Orthopaedic Trauma  [address] [place] [address]  Phone: [phone]  Fax: [phone]  Follow Up Time:

## 2025-02-11 NOTE — ED PROVIDER NOTE - PHYSICAL EXAMINATION
Gen: No acute distress, non toxic  HENT: NCAT, Mucous membranes moist, Oropharynx without exudates, uvula midline  Eyes: pink conjunctivae, EOMI, PERRL  CV: RRR, nl s1/s2.  Resp: CTAB, normal rate and effort  GI: Abdomen soft, NT, ND. No rebound, no guarding  Neuro: A&O x 3, CN II-XII intact, sensorimotor intact without deficits   MSK: +tenderness to palpation L ribs, +tenderness to palpation L patella, No spine or joint tenderness to palpation, Full ROM ext x 4  Skin: No rashes. intact and perfused.

## 2025-02-11 NOTE — ED PROVIDER NOTE - NS ED ROS FT
Gen: denies fever, chills, fatigue, weight loss  Skin: denies rashes, laceration, bruising  HEENT: denies visual changes, ear pain, nasal congestion, throat pain  Respiratory: denies SARMIENTO, SOB, cough, wheezing  Cardiovascular: denies chest pain, palpitations, diaphoresis, LE edema  GI: denies abdominal pain, n/v/d  : denies dysuria, frequency, urgency, bowel/bladder incontinence  MSK: +L chest wall pain, L knee pain  Neuro: denies headache, dizziness, weakness, numbness

## 2025-05-14 ENCOUNTER — OFFICE (OUTPATIENT)
Dept: URBAN - METROPOLITAN AREA CLINIC 112 | Facility: CLINIC | Age: 30
Setting detail: OPHTHALMOLOGY
End: 2025-05-14
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
## 2025-05-14 PROCEDURE — NO SHOW FE NO SHOW FEE: Performed by: OPHTHALMOLOGY
